# Patient Record
Sex: FEMALE | Race: WHITE | HISPANIC OR LATINO | URBAN - METROPOLITAN AREA
[De-identification: names, ages, dates, MRNs, and addresses within clinical notes are randomized per-mention and may not be internally consistent; named-entity substitution may affect disease eponyms.]

---
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
## 2023-01-26 ENCOUNTER — APPOINTMENT (OUTPATIENT)
Dept: OBGYN | Facility: CLINIC | Age: 23
End: 2023-01-26
Payer: MEDICAID

## 2023-01-26 VITALS
WEIGHT: 160 LBS | SYSTOLIC BLOOD PRESSURE: 110 MMHG | HEART RATE: 88 BPM | DIASTOLIC BLOOD PRESSURE: 70 MMHG | HEIGHT: 64.5 IN | OXYGEN SATURATION: 98 % | BODY MASS INDEX: 26.98 KG/M2

## 2023-01-26 DIAGNOSIS — Z30.013 ENCOUNTER FOR INITIAL PRESCRIPTION OF INJECTABLE CONTRACEPTIVE: ICD-10-CM

## 2023-01-26 DIAGNOSIS — Z30.46 ENCOUNTER FOR SURVEILLANCE OF IMPLANTABLE SUBDERMAL CONTRACEPTIVE: ICD-10-CM

## 2023-01-26 PROCEDURE — 76998 US GUIDE INTRAOP: CPT

## 2023-01-26 PROCEDURE — 99205 OFFICE O/P NEW HI 60 MIN: CPT | Mod: 25

## 2023-01-26 PROCEDURE — 11982 REMOVE DRUG IMPLANT DEVICE: CPT | Mod: 22

## 2023-01-26 RX ORDER — MEDROXYPROGESTERONE ACETATE 150 MG/ML
150 INJECTION, SUSPENSION INTRAMUSCULAR
Qty: 1 | Refills: 0 | Status: ACTIVE | COMMUNITY
Start: 2023-01-26 | End: 1900-01-01

## 2023-01-26 NOTE — HISTORY OF PRESENT ILLNESS
[FreeTextEntry1] : Pt refused  multiple times.\par Her significant other Sean is translating. This provider is proficient in [place]isian and the majority of the consultation was in Wallisian.\par \par \par 23 yo P1 with nexplanon in situ. Was once able to feel it but now nonpalpable.\par All: NKDA\par Meds: etonorgestrel\par Obhx: c-sectionx1\par Gynhx: denies\par PMh/PSH:  as above\par \par nexplanon removal\par \par \par The patient was counseled on the risks, benefits and alternatives of the subdermal implant.  The patient desires removal. counseled on signs/symptoms of infection. She is aware of immediate return to fertility. Contraception counseling performed and handout given. Pt additionally counseled on possibility that I cannt remove the implant today and referral to my orthopedic surgery colleague.\par \par Dr. Abbasi performing ultrasound guidance.\par \par Prior to procedure, left arm examined and unable to palpate implant.\par \par Pre-operative time out performed.  Patient’s name, date of birth and procedure confirmed.  The patient’s Left arm was prepped with Betadyne and 1cc of 1% lidocaine was injected.   2 mm stab incision made along distal edge of implant using scalpel. Implant located on ultrasound. Implant grasped with a vasectomy clamp and curved mosquito clamp.  Implant removed, measured and noted to be 4 cm long. \par Incision examined and noted to be hemostatic.  Steristrip was applied with instructions to keep on x 72 hours.  Pressure dressing was applied using sterile gauze, with instructions to keep on x 24 hours.  Patient counseled that they have immediate return to fertility and can get pregnant at this time.\par \par The patient tolerated the procedure well.\par EBL: minimal\par \par \par

## 2023-01-26 NOTE — PLAN
[FreeTextEntry1] : 23 yo s/p deep nexplanon removal. \par - immediate return to fertility\par - Pt thinking about depoprovera for contraception, Rx sent to pharmacy and information sheet given\par - Return to [facility] for routine care\par - Records to be sent to Dr. Bhagat

## 2024-11-21 ENCOUNTER — APPOINTMENT (OUTPATIENT)
Dept: ANTEPARTUM | Facility: CLINIC | Age: 24
End: 2024-11-21
Payer: MEDICAID

## 2024-11-21 ENCOUNTER — ASOB RESULT (OUTPATIENT)
Age: 24
End: 2024-11-21

## 2024-11-21 PROCEDURE — 76801 OB US < 14 WKS SINGLE FETUS: CPT

## 2025-01-09 ENCOUNTER — ASOB RESULT (OUTPATIENT)
Age: 25
End: 2025-01-09

## 2025-01-09 ENCOUNTER — APPOINTMENT (OUTPATIENT)
Dept: ANTEPARTUM | Facility: CLINIC | Age: 25
End: 2025-01-09
Payer: MEDICAID

## 2025-01-09 PROCEDURE — 76817 TRANSVAGINAL US OBSTETRIC: CPT

## 2025-01-09 PROCEDURE — 76805 OB US >/= 14 WKS SNGL FETUS: CPT
